# Patient Record
Sex: MALE | Race: WHITE | Employment: FULL TIME | URBAN - METROPOLITAN AREA
[De-identification: names, ages, dates, MRNs, and addresses within clinical notes are randomized per-mention and may not be internally consistent; named-entity substitution may affect disease eponyms.]

---

## 2020-04-30 ENCOUNTER — APPOINTMENT (OUTPATIENT)
Dept: GENERAL RADIOLOGY | Facility: CLINIC | Age: 40
End: 2020-04-30
Attending: NURSE PRACTITIONER
Payer: COMMERCIAL

## 2020-04-30 ENCOUNTER — HOSPITAL ENCOUNTER (EMERGENCY)
Facility: CLINIC | Age: 40
Discharge: HOME | End: 2020-04-30
Attending: NURSE PRACTITIONER | Admitting: NURSE PRACTITIONER
Payer: COMMERCIAL

## 2020-04-30 VITALS
DIASTOLIC BLOOD PRESSURE: 94 MMHG | WEIGHT: 205 LBS | OXYGEN SATURATION: 98 % | TEMPERATURE: 98.7 F | RESPIRATION RATE: 16 BRPM | SYSTOLIC BLOOD PRESSURE: 152 MMHG

## 2020-04-30 DIAGNOSIS — M79.645 PAIN OF LEFT THUMB: ICD-10-CM

## 2020-04-30 PROCEDURE — 99283 EMERGENCY DEPT VISIT LOW MDM: CPT

## 2020-04-30 PROCEDURE — 73130 X-RAY EXAM OF HAND: CPT | Mod: LT

## 2020-04-30 PROCEDURE — 99282 EMERGENCY DEPT VISIT SF MDM: CPT | Mod: Z6 | Performed by: NURSE PRACTITIONER

## 2020-04-30 ASSESSMENT — ENCOUNTER SYMPTOMS
MYALGIAS: 1
JOINT SWELLING: 0
ARTHRALGIAS: 1

## 2020-04-30 NOTE — ED AVS SNAPSHOT
[facility] Emergency Department  [address] [facility] [address]  Phone:  [phone]  Fax:  [phone]                                    Robbin Walton   MRN: [id number]    Department:  [facility] Emergency Department   Date of Visit:  4/30/2020           After Visit Summary Signature Page    I have received my discharge instructions, and my questions have been answered. I have discussed any challenges I see with this plan with the nurse or doctor.    ..........................................................................................................................................  Patient/Patient Representative Signature      ..........................................................................................................................................  Patient Representative Print Name and Relationship to Patient    ..................................................               ................................................  Date                                   Time    ..........................................................................................................................................  Reviewed by Signature/Title    ...................................................              ..............................................  Date                                               Time          22EPIC Rev 08/18

## 2020-04-30 NOTE — ED NOTES
When I took the patient to x-ray he declined a wheel chair and stated that he wanted to walk. So with that I walked him to x-ray, he was steady on his feet no dizzyness. He was taken to x-ray for a hand injury

## 2020-04-30 NOTE — ED NOTES
Injured hand 1.5 weeks ago; was advised to be seen here for his ongoing hand pain. Pt is in [facility] for alcohol. Tripped and fell going up the stairs after stepping on a toy.

## 2020-05-01 NOTE — ED PROVIDER NOTES
History     Chief Complaint   Patient presents with     Hand Pain     over a week ago he fell onto his hand going up the steps      HPI  Robbin Walton is a 39 year old male who presents to the emergency department for evaluation of left thumb pain. 1 week ago he was walking up the steps when he tripped and landed hyperextending his left thumb. Immediate pain to the thumb and surrounding structures followed by extensive bruising throughout the hand. Denies radiating pain. Denies numbness and tingling. No medications taken for this pain. Currently undergoing treatment for alcoholism at [facility].     Allergies:  Allergies   Allergen Reactions     Ceclor [Cefaclor]      Penicillins        Problem List:    There are no active problems to display for this patient.       Past Medical History:    No past medical history on file.    Past Surgical History:    No past surgical history on file.    Family History:    No family history on file.    Social History:  Marital Status:   [2]  Social History     Tobacco Use     Smoking status: Not on file   Substance Use Topics     Alcohol use: Not on file     Drug use: Not on file        Medications:    No current outpatient medications on file.        Review of Systems   Musculoskeletal: Positive for arthralgias and myalgias. Negative for joint swelling.   All other systems reviewed and are negative.      Physical Exam   BP: (!) 152/94  Heart Rate: 79  Temp: 98.7  F (37.1  C)  Resp: 16  Weight: 93 kg (205 lb)  SpO2: 98 %      Physical Exam  Constitutional:       General: He is not in acute distress.     Appearance: Normal appearance.   Cardiovascular:      Rate and Rhythm: Normal rate and regular rhythm.   Pulmonary:      Effort: Pulmonary effort is normal.      Breath sounds: Normal breath sounds.   Musculoskeletal:      Left hand: He exhibits tenderness (surrounding left thumb). He exhibits normal capillary refill, no deformity, no laceration and no swelling. Normal  sensation noted. Decreased strength noted. He exhibits thumb/finger opposition (d/t pain).      Comments: Pulses and perfusion are equal bilaterally. No overlying erythema, edema, abrasions, or ecchymosis.      Neurological:      Mental Status: He is alert.         ED Course        Procedures    Results for orders placed or performed during the hospital encounter of 04/30/20 (from the past 24 hour(s))   XR Hand Left G/E 3 Views    Narrative    XR HAND LT G/E 3 VW 4/30/2020 5:57 PM     HISTORY: Left thumb pain.    COMPARISON: None.      Impression    IMPRESSION: Normal.    JANE MCGUIRE MD       Medications - No data to display    Assessments & Plan (with Medical Decision Making)   Robbin Walton is a 39 year old male who presents to the emergency department for evaluation of left thumb pain. 1 week ago he was walking up the steps when he tripped and landed hyperextending his left thumb. Exam as above. Imaging obtained with no acute fracture or malalignment. Discussed muscle strain from hyperextension injury. May use OTC medications as needed and appropriate. Apply ice, rest. Return precautions reviewed, all questions answered. Agreeable to plan of care and discharged home in stable condition.   I have reviewed the nursing notes.    I have reviewed the findings, diagnosis, plan and need for follow up with the patient.  There are no discharge medications for this patient.      Final diagnoses:   Pain of left thumb       4/30/2020   [facility] EMERGENCY DEPARTMENT     Jodie Jung APRN CNP  04/30/20 2012